# Patient Record
Sex: MALE | Race: OTHER
[De-identification: names, ages, dates, MRNs, and addresses within clinical notes are randomized per-mention and may not be internally consistent; named-entity substitution may affect disease eponyms.]

---

## 2018-08-14 ENCOUNTER — HOSPITAL ENCOUNTER (OUTPATIENT)
Dept: HOSPITAL 72 - SUR | Age: 33
Discharge: HOME | End: 2018-08-14
Payer: COMMERCIAL

## 2018-08-14 VITALS — DIASTOLIC BLOOD PRESSURE: 85 MMHG | SYSTOLIC BLOOD PRESSURE: 125 MMHG

## 2018-08-14 VITALS — DIASTOLIC BLOOD PRESSURE: 89 MMHG | SYSTOLIC BLOOD PRESSURE: 132 MMHG

## 2018-08-14 VITALS — DIASTOLIC BLOOD PRESSURE: 84 MMHG | SYSTOLIC BLOOD PRESSURE: 120 MMHG

## 2018-08-14 VITALS — SYSTOLIC BLOOD PRESSURE: 140 MMHG | DIASTOLIC BLOOD PRESSURE: 95 MMHG

## 2018-08-14 VITALS — SYSTOLIC BLOOD PRESSURE: 129 MMHG | DIASTOLIC BLOOD PRESSURE: 82 MMHG

## 2018-08-14 VITALS — DIASTOLIC BLOOD PRESSURE: 84 MMHG | SYSTOLIC BLOOD PRESSURE: 130 MMHG

## 2018-08-14 VITALS — WEIGHT: 145 LBS | HEIGHT: 67 IN | BODY MASS INDEX: 22.76 KG/M2

## 2018-08-14 VITALS — DIASTOLIC BLOOD PRESSURE: 76 MMHG | SYSTOLIC BLOOD PRESSURE: 125 MMHG

## 2018-08-14 DIAGNOSIS — H25.811: Primary | ICD-10-CM

## 2018-08-14 DIAGNOSIS — I10: ICD-10-CM

## 2018-08-14 PROCEDURE — 94003 VENT MGMT INPAT SUBQ DAY: CPT

## 2018-08-14 PROCEDURE — 94150 VITAL CAPACITY TEST: CPT

## 2018-08-14 PROCEDURE — 66984 XCAPSL CTRC RMVL W/O ECP: CPT

## 2018-08-14 RX ADMIN — MOXIFLOXACIN HYDROCHLORIDE SCH DROP: 5 SOLUTION/ DROPS OPHTHALMIC at 10:18

## 2018-08-14 RX ADMIN — FLURBIPROFEN SODIUM SCH DROP: 0.3 SOLUTION/ DROPS OPHTHALMIC at 10:18

## 2018-08-14 RX ADMIN — PHENYLEPHRINE HYDROCHLORIDE SCH DROP: 100 SOLUTION/ DROPS OPHTHALMIC at 10:07

## 2018-08-14 RX ADMIN — Medication SCH DROP: at 10:07

## 2018-08-14 RX ADMIN — PHENYLEPHRINE HYDROCHLORIDE SCH DROP: 100 SOLUTION/ DROPS OPHTHALMIC at 10:02

## 2018-08-14 RX ADMIN — MOXIFLOXACIN HYDROCHLORIDE SCH DROP: 5 SOLUTION/ DROPS OPHTHALMIC at 10:07

## 2018-08-14 RX ADMIN — FLURBIPROFEN SODIUM SCH DROP: 0.3 SOLUTION/ DROPS OPHTHALMIC at 10:07

## 2018-08-14 RX ADMIN — CYCLOPENTOLATE HYDROCHLORIDE SCH DROP: 10 SOLUTION OPHTHALMIC at 10:07

## 2018-08-14 RX ADMIN — Medication SCH DROP: at 10:18

## 2018-08-14 RX ADMIN — CYCLOPENTOLATE HYDROCHLORIDE SCH DROP: 10 SOLUTION OPHTHALMIC at 10:18

## 2018-08-14 NOTE — 48 HOUR POST ANESTHESIA EVAL
Post Anesthesia Evaluation


Procedure:  Right cataract extraction with IOL


Date of Evaluation:  Aug 14, 2018


Airway:  patent


Nausea:  No


Vomiting:  No


Pain Intensity:  0


Hydration Status:  adequate


Cardiopulmonary Status:


at baseline


Mental Status/LOC:  patient returned to baseline


Post-Anesthesia Complications:


0


Follow-up care needed:  ready to discharge











Sabina Vu MD Aug 14, 2018 11:48

## 2018-08-14 NOTE — BRIEF OPERATIVE NOTE
Immediate Post Operative Note


Operative Note


Pre-op Diagnosis:


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


Procedure:


cataract extraction with IOL right eye difficult


Post-op Diagnosis:  same as pre-op


Findings:  consistent w/pre-op dx studies


Surgeon:  Sabino Moreland


Anesthesiologist:  Dr. Bocanegra


Anesthesia:  local, MAC


Specimen:  none - none


Complications:  none - none


Condition:  stable


Fluids:  none


Estimated Blood Loss:  none


Drains:  none


Implant(s) used?:  Yes - ZCBOO +8.50











Sabino Moreland MD Aug 14, 2018 12:03

## 2018-08-14 NOTE — OPTHALMOLOGY H&P
Ophthalmology H&P


H&P


Chief Complaint:  decreased vision in right eye





HPI


Vision Affects Ability to:  read, watch TV, drive, focus/use eyes together, 

manage personal affairs


Past Ocular History:  retinal problems


HPI Narrative


Blurred vision right eye interfering with activities





Exam


Visual Acuity:  OD 20/400 OS 20/20


Eye Exam:  normal OU: external exam, palpebral fissure-width, marginal reflex 

distance, levator function, corneas, anterior chambers, fundus exam; findings: 

lens





Assessment/Plan


Treatment Plan:  cataract extraction w/ lens implant


Goals of Treatment:  improvement of vision





Attestation


Attestation


The risks and benefits of the surgery as well as alternative procedures were 

explained to the patient in detail.











Sabino Moreland MD Aug 14, 2018 11:00

## 2018-08-14 NOTE — PRE-PROCEDURE NOTE/ATTESTATION
Pre-Procedure Note/Attestation


Complete Prior to Procedure


Planned Procedure:  right


Procedure Narrative:


cataract extraction with IOL right eye





Indications for Procedure


Pre-Operative Diagnosis:


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





Attestation


I attest that I discussed the nature of the procedure; its benefits; risks and 

complications; and alternatives (and the risks and benefits of such alternatives

), prior to the procedure, with the patient (or the patient's legal 

representative).





I attest that, if there was a reasonable possibility of needing a blood 

transfusion, the patient (or the patient's legal representative) was given the 

[facility] standardized written summary, pursuant 

to the Mykel Evin Blood Safety Act ([place] Health and Safety Code # 1645, as 

amended).





I attest that I re-evaluated the patient just prior to the surgery and that 

there has been no change in the patient's H&P, except as documented below:











Sabino Moreland MD Aug 14, 2018 10:57

## 2018-08-14 NOTE — IMMEDIATE POST-OP EVALUATION
Immediate Post-Op Evalulation


Immediate Post-Op Evalulation


Procedure:  Right cataract extraction with IOL


Date of Evaluation:  Aug 14, 2018


Time of Evaluation:  11:50


IV Fluids:  500


Blood Products:  0


Estimated Blood Loss:  0


Urinary Output:  0


Blood Pressure Systolic:  140


Blood Pressure Diastolic:  95


Pulse Rate:  96


Respiratory Rate:  16


O2 Sat by Pulse Oximetry:  99


Temperature (Fahrenheit):  98.6


Pain Score (1-10):  0


Nausea:  No


Vomiting:  No


Complications


0


Patient Status:  awake, reacts, patent, none


Hydration Status:  adequate


Drug:  N/A











Sabina Vu MD Aug 14, 2018 11:47

## 2018-08-14 NOTE — ANETHESIA PREOPERATIVE EVAL
Anesthesia Pre-op PMH/ROS


General


Date of Evaluation:  Aug 14, 2018


Anesthesiologist:  [place]


ASA Score:  ASA 2


Mallampati Score


Class I : Soft palate, uvula, fauces, pillars visible


Class II: Soft palate, uvula, fauces visible


Class III: Soft palate, base of uvula visible


Class IV: Only hard plate visible


Mallampati Classification:  Class II


Surgeon:  Aniceto


Diagnosis:  Right cataract


Surgical Procedure:  Right cataract extraction with IOL


Anesthesia History:  none


Family History:  no anesthesia problems


Allergies:  


Coded Allergies:  


     No Known Allergies (Unverified , 8/13/18)


Medications:  see eMAR





Past Medical History


Cardiovascular:  Reports: HTN; 


   Denies: CAD, MI, valve dz, arrhythmia, other


Pulmonary:  Denies: asthma, COPD, HALLE, other


Gastrointestinal/Genitourinary:  Denies: GERD, CRI, ESRD, other


Neurologic/Psychiatric:  Denies: dementia, CVA, depression/anxiety, TIA, other


Endocrine:  Denies: DM, hypothyroidism, steroids, other


HEENT:  Denies: cataract (L), cataract (R), glaucoma, [place] (L), [place] (R), other


Hematology/Immune:  Denies: anemia, DVT, bleeding disorder, other


Musculoskeletal/Integumentary:  Denies: OA, RA, DJD, DDD, edema, other


PSxH Narrative:


Right eye vitrectomy





Anesthesia Pre-op Phys. Exam


Physician Exam





Last Vital Signs








  Date Time  Temp Pulse Resp B/P (MAP) Pulse Ox O2 Delivery O2 Flow Rate FiO2


 


8/14/18 10:14      Room Air  


 


8/14/18 09:56 97.8 66 18 129/82 (98)    





 97.8       








Constitutional:  NAD


Cardiovascular:  RRR


Respiratory:  CTA





Airway Exam


Mallampati Score:  Class II


MO:  full


ROM:  full





Anesthesia Pre-op A/P


Labs


see chart





Risk Assessment & Plan


Assessment:


ASA II


Plan:


MAC


Status Change Before Surgery:  No





Pre-Antibiotics


Drug:  N/A











Sabina Vu MD Aug 14, 2018 10:39

## 2018-08-15 NOTE — OPERATIVE NOTE - DICTATED
DATE OF OPERATION:  08/14/2018



PREOPERATIVE DIAGNOSIS:  Visually significant anterior cortical and

posterior subcapsular cataract, right eye.



POSTOPERATIVE DIAGNOSIS:  Visually significant anterior cortical and

posterior subcapsular cataract, right eye.



PROCEDURE PERFORMED:  Cataract extraction with intraocular lens implant,

right eye, CPT code 83699.



SURGEON:  Sabnio Moreland M.D.



ANESTHESIA:  Topical with intracameral with MAC.



COMPLICATIONS:  None.



PROCEDURE IN DETAIL:  After explained the risks and benefits of the

procedure to the patient including the possibility of decrease or even

loss of vision from infection, endophthalmitis, retinal detachment,

macular edema, glaucoma, corneal damage, and other causes and informed

consent was obtained.  The patient was brought into the operating room and

few drops of proparacaine was placed in the right eye.  The right eye was

prepped and draped in usual sterile fashion.  A speculum was placed in the

right eye.  A side-port blade was used to make paracentesis and about 0.5

mL of lidocaine 1% without preservative was injected into the anterior

chamber.  There was no good view of the anterior capsule, so 0.5 mL of

Vision Blue was injected into the anterior chamber and after 20 seconds,

was irrigated out of the eye.  Some viscoelastic was injected into the

anterior chamber.  A 2.75 mm keratome was used to make a temporal limbal

incision at 2.75 mm length.  Some more viscoelastic was injected into the

anterior chamber.  A cystotome and Utrata forceps were used to do a

continuous curvilinear capsulorrhexis and hydrodissection was done using

BSS on a cannula. Phacoemulsification was done using a phaco flip

technique to remove the nucleus and automated irrigation and aspiration

was done to remove all the remaining cortex.  At this point, it was noted

that the posterior capsule was intact.  Some viscoelastic was injected to

inflate the capsular bag.  Using _____ lens was injected into the bag,

Abbott lens thickness _____ CB00 power 8.5 diopters, Sinskey hook was used

to rotate the lens and made sure that the lens was inside of the bag and

then automated irrigation and aspiration was done to remove all the

viscoelastic.  Some Miostat was injected into the anterior chamber and

then the wound was hydrated using BSS _____.  At this point, the wound was

checked for any leakage, which there was none _____ which was very quiet.

It was noted that the lens was well centered _____.  The speculum was

removed from the right eye.  Few drops of pilocarpine and ciprofloxacin

were placed in the right eye.  The area was closed and some Maxitrol

ointment was placed with the right eye and eye pad and eye shields were

also placed and taped to the right eye.  The patient tolerated the

procedure well and there were no complications.  He was transferred to the

recovery room and will be followed up in the office tomorrow morning.









  ______________________________________________

  Sabino Moreland M.D.





DR:  APRIL

D:  08/14/2018 12:25

T:  08/15/2018 05:11

JOB#:  [id number]

CC: